# Patient Record
Sex: FEMALE | Race: BLACK OR AFRICAN AMERICAN | NOT HISPANIC OR LATINO | Employment: FULL TIME | URBAN - METROPOLITAN AREA
[De-identification: names, ages, dates, MRNs, and addresses within clinical notes are randomized per-mention and may not be internally consistent; named-entity substitution may affect disease eponyms.]

---

## 2017-02-26 ENCOUNTER — HOSPITAL ENCOUNTER (EMERGENCY)
Facility: HOSPITAL | Age: 27
Discharge: HOME | End: 2017-02-27
Attending: SURGERY
Payer: MEDICAID

## 2017-02-26 DIAGNOSIS — R07.9 CHEST PAIN: ICD-10-CM

## 2017-02-26 DIAGNOSIS — N39.0 URINARY TRACT INFECTION WITHOUT HEMATURIA, SITE UNSPECIFIED: ICD-10-CM

## 2017-02-26 DIAGNOSIS — R00.0 TACHYCARDIA: Primary | ICD-10-CM

## 2017-02-26 PROCEDURE — 36415 COLL VENOUS BLD VENIPUNCTURE: CPT

## 2017-02-26 PROCEDURE — 84100 ASSAY OF PHOSPHORUS: CPT

## 2017-02-26 PROCEDURE — 96374 THER/PROPH/DIAG INJ IV PUSH: CPT

## 2017-02-26 PROCEDURE — 85007 BL SMEAR W/DIFF WBC COUNT: CPT

## 2017-02-26 PROCEDURE — 84443 ASSAY THYROID STIM HORMONE: CPT

## 2017-02-26 PROCEDURE — 83735 ASSAY OF MAGNESIUM: CPT

## 2017-02-26 PROCEDURE — 25000003 PHARM REV CODE 250: Performed by: SURGERY

## 2017-02-26 PROCEDURE — 85379 FIBRIN DEGRADATION QUANT: CPT

## 2017-02-26 PROCEDURE — 85027 COMPLETE CBC AUTOMATED: CPT

## 2017-02-26 PROCEDURE — 93010 ELECTROCARDIOGRAM REPORT: CPT | Mod: ,,, | Performed by: INTERNAL MEDICINE

## 2017-02-26 PROCEDURE — 96375 TX/PRO/DX INJ NEW DRUG ADDON: CPT

## 2017-02-26 PROCEDURE — 83880 ASSAY OF NATRIURETIC PEPTIDE: CPT

## 2017-02-26 PROCEDURE — 84484 ASSAY OF TROPONIN QUANT: CPT

## 2017-02-26 PROCEDURE — 99284 EMERGENCY DEPT VISIT MOD MDM: CPT | Mod: 25

## 2017-02-26 PROCEDURE — 96361 HYDRATE IV INFUSION ADD-ON: CPT

## 2017-02-26 PROCEDURE — 80053 COMPREHEN METABOLIC PANEL: CPT

## 2017-02-26 RX ORDER — ADENOSINE 3 MG/ML
6 INJECTION, SOLUTION INTRAVENOUS
Status: COMPLETED | OUTPATIENT
Start: 2017-02-27 | End: 2017-02-26

## 2017-02-26 RX ORDER — ADENOSINE 3 MG/ML
12 INJECTION, SOLUTION INTRAVENOUS
Status: COMPLETED | OUTPATIENT
Start: 2017-02-27 | End: 2017-02-26

## 2017-02-26 RX ORDER — DILTIAZEM HYDROCHLORIDE 5 MG/ML
INJECTION INTRAVENOUS
Status: DISCONTINUED
Start: 2017-02-26 | End: 2017-02-27 | Stop reason: HOSPADM

## 2017-02-26 RX ORDER — SODIUM CHLORIDE 9 MG/ML
1000 INJECTION, SOLUTION INTRAVENOUS
Status: COMPLETED | OUTPATIENT
Start: 2017-02-26 | End: 2017-02-27

## 2017-02-26 RX ORDER — ADENOSINE 3 MG/ML
INJECTION, SOLUTION INTRAVENOUS
Status: DISCONTINUED
Start: 2017-02-26 | End: 2017-02-27 | Stop reason: HOSPADM

## 2017-02-26 RX ADMIN — ADENOSINE 6 MG: 3 INJECTION, SOLUTION INTRAVENOUS at 11:02

## 2017-02-26 RX ADMIN — ADENOSINE 12 MG: 3 INJECTION, SOLUTION INTRAVENOUS at 11:02

## 2017-02-26 RX ADMIN — MORPHINE SULFATE 2 MG: 2 INJECTION, SOLUTION INTRAMUSCULAR; INTRAVENOUS at 11:02

## 2017-02-26 RX ADMIN — SODIUM CHLORIDE 1000 ML: 0.9 INJECTION, SOLUTION INTRAVENOUS at 11:02

## 2017-02-26 NOTE — ED AVS SNAPSHOT
[facility]  [address] [id number]               Franky Amezcua   2017 11:36 PM   ED    Description:  Female : 1990   Department:  [facility]           Your Care was Coordinated By:     Provider Role From To    Rosanna Faust MD Attending Provider [phone]    Robbie Perera MD Attending Provider 17 [phone] --      Reason for Visit     Anxiety           Diagnoses this Visit        Comments    Tachycardia    -  Primary     Chest pain         Urinary tract infection without hematuria, site unspecified           ED Disposition     ED Disposition Condition Comment    Discharge             To Do List            These Medications        Disp Refills Start End    nitrofurantoin, macrocrystal-monohydrate, (MACROBID) 100 MG capsule 14 capsule 0 2017 3/6/2017    Take 1 capsule (100 mg total) by mouth 2 (two) times daily. - Oral      [facility]     [facility] -  Assistance  Registered nurses in the [facility] provide clinical advisement, health education, appointment booking, and other advisory services.  Call for this free service at [phone].             Medications           Message regarding Medications     Verify the changes and/or additions to your medication regime listed below are the same as discussed with your clinician today.  If any of these changes or additions are incorrect, please notify your healthcare provider.        START taking these NEW medications        Refills    nitrofurantoin, macrocrystal-monohydrate, (MACROBID) 100 MG capsule 0    Sig: Take 1 capsule (100 mg total) by mouth 2 (two) times daily.    Class: Print    Route: Oral      These medications were administered today        Dose Freq    sodium chloride 0.9% bolus 1,000 mL 1,000 mL ED 1 Time    Sig: Inject 1,000 mLs into the vein ED 1 Time.    Class: Normal    Route: Intravenous     0.9%  NaCl infusion 1,000 mL ED 1 Time    Sig: Inject 1,000 mLs into the vein ED 1 Time.    Class: Normal    Route: Intravenous    morphine injection 2 mg 2 mg ED 1 Time    Starting on: 2/27/2017    Sig: Inject 1 mL (2 mg total) into the vein ED 1 Time.    Class: Normal    Route: Intravenous    adenosine injection 6 mg 6 mg ED 1 Time    Starting on: 2/27/2017    Sig: Inject 2 mLs (6 mg total) into the vein ED 1 Time.    Class: Normal    Route: Intravenous    adenosine injection 12 mg 12 mg ED 1 Time    Starting on: 2/27/2017    Sig: Inject 4 mLs (12 mg total) into the vein ED 1 Time.    Class: Normal    Route: Intravenous    adenosine (ADENOCARD) 3 mg/mL injection      Notes to Pharmacy: Created by cabinet override    diltiaZEM (CARDIZEM) 5 mg/mL injection      Notes to Pharmacy: Created by cabinet override    diltiaZEM injection 25 mg 25 mg ED 1 Time    Sig: Inject 5 mLs (25 mg total) into the vein ED 1 Time.    Class: Normal    Route: Intravenous    diltiaZEM injection 25 mg 25 mg ED 1 Time    Sig: Inject 5 mLs (25 mg total) into the vein ED 1 Time.    Class: Normal    Route: Intravenous    potassium chloride SA CR tablet 40 mEq 40 mEq ED 1 Time    Sig: Take 2 tablets (40 mEq total) by mouth ED 1 Time.    Class: Normal    Route: Oral    potassium chloride 10% solution 40 mEq 40 mEq ED 1 Time    Sig: Take 30 mLs (40 mEq total) by mouth ED 1 Time.    Class: Normal    Route: Oral    diltiaZEM 24 hr capsule 120 mg 120 mg ED 1 Time    Sig: Take 1 capsule (120 mg total) by mouth ED 1 Time.    Class: Normal    Route: Oral    metoprolol injection 5 mg 5 mg ED 1 Time    Sig: Inject 5 mLs (5 mg total) into the vein ED 1 Time.    Class: Normal    Route: Intravenous    0.9%  NaCl infusion 1,000 mL ED 1 Time    Sig: Inject 1,000 mLs into the vein ED 1 Time.    Class: Normal    Route: Intravenous    nitrofurantoin (macrocrystal-monohydrate) 100 MG capsule 100 mg 100 mg ED 1 Time    Sig: Take 1 capsule (100 mg total) by mouth ED  1 Time.    Class: Normal    Route: Oral           Verify that the below list of medications is an accurate representation of the medications you are currently taking.  If none reported, the list may be blank. If incorrect, please contact your healthcare provider. Carry this list with you in case of emergency.           Current Medications     nitrofurantoin, macrocrystal-monohydrate, (MACROBID) 100 MG capsule Take 1 capsule (100 mg total) by mouth 2 (two) times daily.           Clinical Reference Information           Your Vitals Were     BP                   110/65           Allergies as of 2/27/2017     No Known Allergies      Immunizations Administered on Date of Encounter - 2/27/2017     None      ED Micro, Lab, POCT     Start Ordered       Status Ordering Provider    02/27/17 0441 02/27/17 0440  Urine culture  Add-on      Completed     02/27/17 0131 02/27/17 0130  TSH  STAT      Final result     02/27/17 0131 02/27/17 0130  Phosphorus  STAT      Final result     02/27/17 0131 02/27/17 0130  D dimer, quantitative  STAT      Final result     02/26/17 2354 02/26/17 2353  Magnesium  STAT      Final result     02/26/17 2351 02/26/17 2351  Troponin I  STAT      Final result     02/26/17 2351 02/26/17 2351  Brain natriuretic peptide  STAT      Final result     02/26/17 2343 02/26/17 2342  Drug screen panel, emergency  STAT      Final result     02/26/17 2343 02/26/17 2342  Pregnancy, urine rapid  STAT      Final result     02/26/17 2342 02/26/17 2342  Complete Blood Count (CBC)  STAT      Final result     02/26/17 2342 02/26/17 2342  Comprehensive Metabolic Panel (CMP)  STAT      Final result     02/26/17 2342 02/26/17 2342  Urinalysis  STAT      Final result     02/26/17 2342 02/26/17 2342  Urinalysis Microscopic  Once      Final result       ED Imaging Orders     Start Ordered       Status Ordering Provider    02/26/17 2351 02/26/17 2351  X-Ray Chest 1 View  1 time imaging      In process         Discharge  "Instructions         Bladder Infection, Female (Adult)    Urine is normally doesn't have any bacteria in it. But bacteria can get into the urinary tract from the skin around the rectum. Or they can travel in the blood from elsewhere in the body. Once they are in your urinary tract, they can cause infection in the urethra (urethritis), the bladder (cystitis), or the kidneys (pyelonephritis).  The most common place for an infection is in the bladder. This is called a bladder infection. This is one of the most common infections in women. Most bladder infections are easily treated. They are not serious unless the infection spreads to the kidney.  The phrases "bladder infection," "UTI," and "cystitis" are often used to describe the same thing. But they are not always the same. Cystitis is an inflammation of the bladder. The most common cause of cystitis is an infection.  Symptoms  The infection causes inflammation in the urethra and bladder. This causes many of the symptoms. The most common symptoms of a bladder infection are:  · Pain or burning when urinating  · Having to urinate more often than usual  · Urgent need to urinate  · Only a small amount of urine comes out  · Blood in urine  · Abdominal discomfort. This is usually in the lower abdomen above the pubic bone.  · Cloudy urine  · Strong- or bad-smelling urine  · Unable to urinate (urinary retention)  · Unable to hold urine in (urinary incontinence)  · Fever  · Loss of appetite  · Confusion (in older adults)  Causes  Bladder infections are not contagious. You can't get one from someone else, from a toilet seat, or from sharing a bath.  The most common cause of bladder infections is bacteria from the bowels. The bacteria get onto the skin around the opening of the urethra. From there, they can get into the urine and travel up to the bladder, causing inflammation and infection. This usually happens because of:  · Wiping improperly after urinating. Always wipe from " front to back.  · Bowel incontinence  · Pregnancy  · Procedures such as having a catheter inserted  · Older age  · Not emptying your bladder. This can allow bacteria a chance to grow in your urine.  · Dehydration  · Constipation  · Sex  · Use of a diaphragm for birth control   Treatment  Bladder infections are diagnosed by a urine test. They are treated with antibiotics and usually clear up quickly without complications. Treatment helps prevent a more serious kidney infection.  Medicines  Medicines can help in the treatment of a bladder infection:  · Take antibiotics until they are used up, even if you feel better. It is important to finish them to make sure the infection has cleared.  · You can use acetaminophen or ibuprofen for pain, fever, or discomfort, unless another medicine was prescribed. If you have chronic liver or kidney disease, talk with your healthcare provider before using these medicines. Also talk with your provider if you've ever had a stomach ulcer or gastrointestinal bleeding, or are taking blood-thinner medicines.  · If you are given phenazopydridine to reduce burning with urination, it will cause your urine to become a bright orange color. This can stain clothing.  Care and prevention  These self-care steps can help prevent future infections:  · Drink plenty of fluids to prevent dehydration and flush out your bladder. Do this unless you must restrict fluids for other health reasons, or your doctor told you not to.  · Proper cleaning after going to the bathroom is important. Wipe from front to back after using the toilet to prevent the spread of bacteria.  · Urinate more often. Don't try to hold urine in for a long time.  · Wear loose-fitting clothes and cotton underwear. Avoid tight-fitting pants.  · Improve your diet and prevent constipation. Eat more fresh fruit and vegetables, and fiber, and less junk and fatty foods.  · Avoid sex until your symptoms are gone.  · Avoid caffeine, alcohol, and  spicy foods. These can irritate your bladder.  · Urinate right after intercourse to flush out your bladder.  · If you use birth control pills and have frequent bladder infections, discuss it with your doctor.  Follow-up care  Call your healthcare provider if all symptoms are not gone after 3 days of treatment. This is especially important if you have repeat infections.  If a culture was done, you will be told if your treatment needs to be changed. If directed, you can call to find out the results.  If X-rays were done, you will be told if the results will affect your treatment.  Call 911  Call 911 if any of the following occur:  · Trouble breathing  · Hard to wake up or confusion  · Fainting or loss of consciousness  · Rapid heart rate  When to seek medical advice  Call your healthcare provider right away if any of these occur:  · Fever of 100.4ºF (38.0ºC) or higher, or as directed by your healthcare provider  · Symptoms are not better by the third day of treatment  · Back or belly (abdominal) pain that gets worse  · Repeated vomiting, or unable to keep medicine down  · Weakness or dizziness  · Vaginal discharge  · Pain, redness, or swelling in the outer vaginal area (labia)  Date Last Reviewed: 10/1/2016  © 1632-4691 [employer]. [address] [place] [address], [place] [address]. All rights reserved. This information is not intended as a substitute for professional medical care. Always follow your healthcare professional's instructions.          Discharge References/Attachments     TACHYCARDIA, UNDERSTANDING (ENGLISH)      [facility] Sign-Up     Activating your [facility] account is as easy as 1-2-3!     1) Visit [email or web address], select Sign Up Now, enter this activation code and your date of birth, then select Next.  [email or web address]  Expires: 4/13/2017 12:27 AM      2) Create a username and password to use when you visit [facility] in the future and select a security question in case you lose your password and  select Next.    3) Enter your e-mail address and click Sign Up!    Additional Information  If you have questions, please e-mail [email or web address] or call [phone] to talk to our [facility] staff. Remember, [facility] is NOT to be used for urgent needs. For medical emergencies, dial 911.          [facility] complies with applicable Federal civil rights laws and does not discriminate on the basis of race, color, national origin, age, disability, or sex.        Language Assistance Services     ATTENTION: Language assistance services are available, free of charge. Please call [phone].      ATENCIÓN: Si habla español, tiene a hinojosa disposición servicios gratuitos de asistencia lingüística. Llame al [phone].     CHÚ Ý: N?u b?n nói Ti?ng Vi?t, có các d?ch v? h? tr? ngôn ng? mi?n phí dành cho b?n. G?i s? [phone].

## 2017-02-27 VITALS
HEART RATE: 82 BPM | DIASTOLIC BLOOD PRESSURE: 65 MMHG | OXYGEN SATURATION: 100 % | TEMPERATURE: 98 F | RESPIRATION RATE: 17 BRPM | WEIGHT: 115 LBS | SYSTOLIC BLOOD PRESSURE: 110 MMHG | BODY MASS INDEX: 20.37 KG/M2

## 2017-02-27 LAB
ALBUMIN SERPL BCP-MCNC: 4.2 G/DL
ALP SERPL-CCNC: 59 U/L
ALT SERPL W/O P-5'-P-CCNC: 11 U/L
AMPHET+METHAMPHET UR QL: NEGATIVE
ANION GAP SERPL CALC-SCNC: 15 MMOL/L
AST SERPL-CCNC: 18 U/L
B-HCG UR QL: NEGATIVE
BACTERIA #/AREA URNS HPF: ABNORMAL /HPF
BARBITURATES UR QL SCN>200 NG/ML: NEGATIVE
BASOPHILS # BLD AUTO: ABNORMAL K/UL
BASOPHILS NFR BLD: 0 %
BENZODIAZ UR QL SCN>200 NG/ML: NEGATIVE
BILIRUB SERPL-MCNC: 0.4 MG/DL
BILIRUB UR QL STRIP: NEGATIVE
BNP SERPL-MCNC: <10 PG/ML
BUN SERPL-MCNC: 16 MG/DL
BZE UR QL SCN: NEGATIVE
CALCIUM SERPL-MCNC: 9.7 MG/DL
CANNABINOIDS UR QL SCN: NORMAL
CHLORIDE SERPL-SCNC: 105 MMOL/L
CLARITY UR: ABNORMAL
CO2 SERPL-SCNC: 18 MMOL/L
COLOR UR: YELLOW
CREAT SERPL-MCNC: 0.9 MG/DL
CREAT UR-MCNC: 237.7 MG/DL
D DIMER PPP IA.FEU-MCNC: 0.48 MG/L FEU
DIFFERENTIAL METHOD: ABNORMAL
EOSINOPHIL # BLD AUTO: ABNORMAL K/UL
EOSINOPHIL NFR BLD: 0 %
ERYTHROCYTE [DISTWIDTH] IN BLOOD BY AUTOMATED COUNT: 14.3 %
EST. GFR  (AFRICAN AMERICAN): >60 ML/MIN/1.73 M^2
EST. GFR  (NON AFRICAN AMERICAN): >60 ML/MIN/1.73 M^2
GIANT PLATELETS BLD QL SMEAR: PRESENT
GLUCOSE SERPL-MCNC: 158 MG/DL
GLUCOSE UR QL STRIP: NEGATIVE
HCT VFR BLD AUTO: 36.4 %
HGB BLD-MCNC: 12 G/DL
HGB UR QL STRIP: ABNORMAL
HYALINE CASTS #/AREA URNS LPF: 0 /LPF
KETONES UR QL STRIP: NEGATIVE
LEUKOCYTE ESTERASE UR QL STRIP: ABNORMAL
LYMPHOCYTES # BLD AUTO: ABNORMAL K/UL
LYMPHOCYTES NFR BLD: 64 %
MAGNESIUM SERPL-MCNC: 2 MG/DL
MCH RBC QN AUTO: 25.8 PG
MCHC RBC AUTO-ENTMCNC: 33 %
MCV RBC AUTO: 78 FL
METHADONE UR QL SCN>300 NG/ML: NEGATIVE
MICROSCOPIC COMMENT: ABNORMAL
MONOCYTES # BLD AUTO: ABNORMAL K/UL
MONOCYTES NFR BLD: 5 %
NEUTROPHILS NFR BLD: 31 %
NITRITE UR QL STRIP: NEGATIVE
OPIATES UR QL SCN: NEGATIVE
PCP UR QL SCN>25 NG/ML: NEGATIVE
PH UR STRIP: 7 [PH] (ref 5–8)
PHOSPHATE SERPL-MCNC: 3.4 MG/DL
PLATELET # BLD AUTO: 307 K/UL
PLATELET BLD QL SMEAR: ABNORMAL
PMV BLD AUTO: 10 FL
POTASSIUM SERPL-SCNC: 3.1 MMOL/L
PROT SERPL-MCNC: 8.8 G/DL
PROT UR QL STRIP: ABNORMAL
RBC # BLD AUTO: 4.66 M/UL
RBC #/AREA URNS HPF: 4 /HPF (ref 0–4)
SCHISTOCYTES BLD QL SMEAR: ABNORMAL
SODIUM SERPL-SCNC: 138 MMOL/L
SP GR UR STRIP: 1.02 (ref 1–1.03)
SQUAMOUS #/AREA URNS HPF: 30 /HPF
TOXICOLOGY INFORMATION: NORMAL
TROPONIN I SERPL DL<=0.01 NG/ML-MCNC: <0.006 NG/ML
TSH SERPL DL<=0.005 MIU/L-ACNC: 1.08 UIU/ML
URN SPEC COLLECT METH UR: ABNORMAL
UROBILINOGEN UR STRIP-ACNC: 1 EU/DL
WBC # BLD AUTO: 11.19 K/UL
WBC #/AREA URNS HPF: 10 /HPF (ref 0–5)
YEAST URNS QL MICRO: ABNORMAL

## 2017-02-27 PROCEDURE — 93041 RHYTHM ECG TRACING: CPT

## 2017-02-27 PROCEDURE — 87186 SC STD MICRODIL/AGAR DIL: CPT

## 2017-02-27 PROCEDURE — 63600175 PHARM REV CODE 636 W HCPCS: Performed by: SURGERY

## 2017-02-27 PROCEDURE — 81000 URINALYSIS NONAUTO W/SCOPE: CPT

## 2017-02-27 PROCEDURE — 36415 COLL VENOUS BLD VENIPUNCTURE: CPT

## 2017-02-27 PROCEDURE — 87088 URINE BACTERIA CULTURE: CPT

## 2017-02-27 PROCEDURE — 81025 URINE PREGNANCY TEST: CPT

## 2017-02-27 PROCEDURE — 87077 CULTURE AEROBIC IDENTIFY: CPT

## 2017-02-27 PROCEDURE — 93005 ELECTROCARDIOGRAM TRACING: CPT

## 2017-02-27 PROCEDURE — 87086 URINE CULTURE/COLONY COUNT: CPT

## 2017-02-27 PROCEDURE — 93010 ELECTROCARDIOGRAM REPORT: CPT | Mod: ,,, | Performed by: INTERNAL MEDICINE

## 2017-02-27 PROCEDURE — 82570 ASSAY OF URINE CREATININE: CPT

## 2017-02-27 PROCEDURE — 25000003 PHARM REV CODE 250: Performed by: SURGERY

## 2017-02-27 RX ORDER — NITROFURANTOIN 25; 75 MG/1; MG/1
100 CAPSULE ORAL 2 TIMES DAILY
Qty: 14 CAPSULE | Refills: 0 | Status: SHIPPED | OUTPATIENT
Start: 2017-02-27 | End: 2017-03-06

## 2017-02-27 RX ORDER — DILTIAZEM HYDROCHLORIDE 120 MG/1
120 CAPSULE, COATED, EXTENDED RELEASE ORAL
Status: COMPLETED | OUTPATIENT
Start: 2017-02-27 | End: 2017-02-27

## 2017-02-27 RX ORDER — METOPROLOL TARTRATE 1 MG/ML
5 INJECTION, SOLUTION INTRAVENOUS
Status: COMPLETED | OUTPATIENT
Start: 2017-02-27 | End: 2017-02-27

## 2017-02-27 RX ORDER — POTASSIUM CHLORIDE 20 MEQ/1
40 TABLET, EXTENDED RELEASE ORAL
Status: COMPLETED | OUTPATIENT
Start: 2017-02-27 | End: 2017-02-27

## 2017-02-27 RX ORDER — POTASSIUM CHLORIDE 20 MEQ/15ML
40 SOLUTION ORAL
Status: COMPLETED | OUTPATIENT
Start: 2017-02-27 | End: 2017-02-27

## 2017-02-27 RX ORDER — NITROFURANTOIN 25; 75 MG/1; MG/1
100 CAPSULE ORAL
Status: COMPLETED | OUTPATIENT
Start: 2017-02-27 | End: 2017-02-27

## 2017-02-27 RX ORDER — DILTIAZEM HYDROCHLORIDE 5 MG/ML
25 INJECTION INTRAVENOUS
Status: COMPLETED | OUTPATIENT
Start: 2017-02-27 | End: 2017-02-27

## 2017-02-27 RX ORDER — SODIUM CHLORIDE 9 MG/ML
1000 INJECTION, SOLUTION INTRAVENOUS
Status: COMPLETED | OUTPATIENT
Start: 2017-02-27 | End: 2017-02-27

## 2017-02-27 RX ADMIN — NITROFURANTOIN MONOHYDRATE AND NITROFURANTOIN MACROCRYSTALLINE 100 MG: 75; 25 CAPSULE ORAL at 04:02

## 2017-02-27 RX ADMIN — DILTIAZEM HYDROCHLORIDE 25 MG: 5 INJECTION INTRAVENOUS at 12:02

## 2017-02-27 RX ADMIN — METOPROLOL TARTRATE 5 MG: 5 INJECTION INTRAVENOUS at 01:02

## 2017-02-27 RX ADMIN — SODIUM CHLORIDE 1000 ML: 0.9 INJECTION, SOLUTION INTRAVENOUS at 01:02

## 2017-02-27 RX ADMIN — SODIUM CHLORIDE 1000 ML: 0.9 INJECTION, SOLUTION INTRAVENOUS at 12:02

## 2017-02-27 RX ADMIN — POTASSIUM CHLORIDE 40 MEQ: 1500 TABLET, EXTENDED RELEASE ORAL at 12:02

## 2017-02-27 RX ADMIN — POTASSIUM CHLORIDE 40 MEQ: 1.5 SOLUTION ORAL at 12:02

## 2017-02-27 RX ADMIN — NIFEDIPINE 120 MG: 10 CAPSULE ORAL at 01:02

## 2017-02-27 NOTE — ED PROVIDER NOTES
[facility]                                                         Chief Complaint  26 y.o. female with Anxiety    History of Present Illness  Franky Amezcua presents to the emergency room with an acute onset of rapid heart rate.  She states she   was getting ready to go to bed when she felt her heart pounding.  She went to speak to her mother who  placed her hand on her chest and realized that her heart was beating very quickly.  She was brought to the   emergency room.  She feels chilled.  She is shaking.  She denies using any drugs or drinking any energy   Drinks. She drank a daiquiri from the [employer].  She was not at a bar.  She never had this before.  No history of cardiac disease.  No nausea or vomiting.  No chest pain but she is aware of her heart beating   quickly.  No radiation of pain.  No weakness.  No diaphoresis.    History reviewed. No pertinent past medical history.  History reviewed. No pertinent surgical history.   Review of patient's allergies indicates:  No Known Allergies     Review of Systems and Physical Exam     Review of Systems  -- Constitution - no fever, denies fatigue, no weakness, no chills  -- Eyes - no tearing or redness, no visual disturbance  -- Ear, Nose - no tinnitus or earache, no nasal congestion or discharge  -- Mouth,Throat - no sore throat, no toothache, normal voice, normal swallowing  -- Respiratory - denies cough and congestion, no shortness of breath, no VALERIO  -- Cardiovascular - denies chest pain, palpitations- acute, denies claudication  -- Gastrointestinal - denies abdominal pain, nausea, vomiting, or diarrhea  -- Genitourinary - no dysuria, no denies flank pain, no hematuria or frequency   -- Musculoskeletal - denies back pain, negative for myalgias and arthralgias   -- Neurological - no headache, denies weakness or seizure; no LOC  -- Skin - denies pallor, rash, or changes in skin. no hives or welts noted    Vital Signs  -- Her blood  pressure is 110/65 and her pulse is 82.   -- Her respiration is 17 and oxygen saturation is 100%.      Physical Exam  -- Nursing note and vitals reviewed  -- Constitutional: Appears well-developed and well-nourished  -- Head: Atraumatic. Normocephalic. No obvious abnormality  -- Eyes: Pupils are equal and reactive to light. Normal conjunctiva and lids  -- Cardiac: Tachycardia, regular rhythm and normal heart sounds  -- Pulmonary: Normal respiratory effort, breath sounds clear to auscultation  -- Abdominal: Soft, no tenderness. Normal bowel sounds. Normal liver edge  -- Musculoskeletal: Normal range of motion, no effusions. Joints stable   -- Neurological: No focal deficits. Showed good interaction with staff  -- Vascular: Posterior tibial, dorsalis pedis and radial pulses 2+ bilaterally      Emergency Room Course     Lab values  -- The CBC drawn in the ER today was within normal limits   -- Urinalysis performed during this ER visit showed signs of infection   -- The urine today has been sent for lab culture, results pending   -- The urine pregnancy test today was negative; patient informed of the results   -- The magnesium and phosphorus were within normal limits   -- The BNP drawn in the ER today were within normal limits   -- TSH was normal, urinalysis had marijuana  -- The d-dimer drawn in the ER today were within normal limits.   -- The cardiac enzymes were within normal limits     Medications Given  -- sodium chloride 0.9% bolus 1,000 mL (0 mLs Intravenous Stopped 2/27/17 0033)   -- 0.9%  NaCl infusion (0 mLs Intravenous Stopped 2/27/17 0135)   -- morphine injection 2 mg (2 mg Intravenous Given 2/26/17 2352)   -- adenosine injection 6 mg (6 mg Intravenous Given 2/26/17 2352)   -- adenosine injection 12 mg (12 mg Intravenous Given 2/26/17 2355)   -- diltiaZEM injection 25 mg (25 mg Intravenous Given 2/27/17 0000)   -- diltiaZEM injection 25 mg (25 mg Intravenous Given 2/27/17 0025)   -- potassium chloride SA CR  tablet 40 mEq (40 mEq Oral Given 2/27/17 0032)   -- potassium chloride 10% solution 40 mEq (40 mEq Oral Given 2/27/17 0052)   -- diltiaZEM 24 hr capsule 120 mg (120 mg Oral Given 2/27/17 0103)   -- metoprolol injection 5 mg (5 mg Intravenous Given 2/27/17 0133)   -- 0.9%  NaCl infusion (0 mLs Intravenous Stopped 2/27/17 0225)   -- nitrofurantoin (macrocrystal-monohydrate) 100 MG capsule 100 mg     ED Management  -- Patient presented to Dr. Faust with a heart rate in the 150s after drinking and using marijuana  -- Patient was given IV fluids and medication in the ER, palpitations stopped and she felt better  -- Patient was monitored over several hours on IV fluid, heart rate now is 82 bpm in the ER  -- EKG is normal sinus rhythm now, negative troponin, negative d-dimer, negative BNP here  -- Asymptomatic now and warm to the issues of drinking and using drugs, voiced understanding    Diagnosis  -- Tachycardia  -- Urinary tract infection without hematuria    Disposition and Plan  -- Disposition: home  -- Condition: stable  -- Follow-up: Patient to follow up with a MD in 1-2 days.  -- I advised the patient that we have found no life threatening condition today  -- At this time, I believe the patient is clinically stable for discharge.   -- The patient acknowledges that close follow up with a MD is required   -- Patient agrees to comply with all instruction and direction given in the ER    This note is dictated on Dragon Natural Speaking word recognition program.  There are word recognition mistakes that are occasionally missed on review.             Robbie Perera MD  02/27/17 8342

## 2017-02-27 NOTE — DISCHARGE INSTRUCTIONS

## 2017-03-01 LAB — BACTERIA UR CULT: NORMAL

## 2017-03-02 NOTE — PHYSICIAN QUERY
PT Name: Franky Amezcua  MR #: [id number]     Physician Query Form - Documentation Clarification    Reviewer  Pat Olvera   Ext [phone]    This form is a permanent document in the medical record.     Query Date: March 2, 2017  By submitting this query, we are merely seeking further clarification of documentation to reflect the severity of illness of your patient. Please utilize your independent clinical judgment when addressing the question(s) below.    (The Medical record reflects the following:)      Supporting Clinical Findings Location in Medical Record   Urine culture positive for Staphylococcus Epidermidis >10,000 - 49,999.       Please review lab culture results.                                                                                      Doctor, Please specify diagnosis or diagnoses associated with above clinical findings.    Physician Use Only                                                                                                                               [  ] Unable to determine          Patient discharged on Macroabid.  Urine culture not consistent with infection.  Patient to follow up with PCP.

## 2017-05-01 ENCOUNTER — HOSPITAL ENCOUNTER (EMERGENCY)
Facility: HOSPITAL | Age: 27
Discharge: HOME | End: 2017-05-01
Attending: SURGERY
Payer: MEDICAID

## 2017-05-01 VITALS
SYSTOLIC BLOOD PRESSURE: 112 MMHG | RESPIRATION RATE: 20 BRPM | HEART RATE: 95 BPM | TEMPERATURE: 97 F | DIASTOLIC BLOOD PRESSURE: 60 MMHG

## 2017-05-01 DIAGNOSIS — N30.00 ACUTE CYSTITIS WITHOUT HEMATURIA: Primary | ICD-10-CM

## 2017-05-01 LAB
ALBUMIN SERPL BCP-MCNC: 3.2 G/DL
ALP SERPL-CCNC: 46 U/L
ALT SERPL W/O P-5'-P-CCNC: 12 U/L
AMYLASE SERPL-CCNC: 91 U/L
ANION GAP SERPL CALC-SCNC: 10 MMOL/L
AST SERPL-CCNC: 17 U/L
BACTERIA #/AREA URNS HPF: ABNORMAL /HPF
BASOPHILS # BLD AUTO: 0.01 K/UL
BASOPHILS NFR BLD: 0.2 %
BILIRUB SERPL-MCNC: 0.1 MG/DL
BILIRUB UR QL STRIP: NEGATIVE
BUN SERPL-MCNC: 11 MG/DL
CALCIUM SERPL-MCNC: 9 MG/DL
CHLORIDE SERPL-SCNC: 104 MMOL/L
CLARITY UR: CLEAR
CO2 SERPL-SCNC: 22 MMOL/L
COLOR UR: YELLOW
CREAT SERPL-MCNC: 0.6 MG/DL
DIFFERENTIAL METHOD: ABNORMAL
EOSINOPHIL # BLD AUTO: 0.1 K/UL
EOSINOPHIL NFR BLD: 1.8 %
ERYTHROCYTE [DISTWIDTH] IN BLOOD BY AUTOMATED COUNT: 14.9 %
EST. GFR  (AFRICAN AMERICAN): >60 ML/MIN/1.73 M^2
EST. GFR  (NON AFRICAN AMERICAN): >60 ML/MIN/1.73 M^2
GLUCOSE SERPL-MCNC: 73 MG/DL
GLUCOSE UR QL STRIP: NEGATIVE
HCT VFR BLD AUTO: 31.4 %
HGB BLD-MCNC: 10.3 G/DL
HGB UR QL STRIP: ABNORMAL
HYALINE CASTS #/AREA URNS LPF: 0 /LPF
KETONES UR QL STRIP: NEGATIVE
LEUKOCYTE ESTERASE UR QL STRIP: ABNORMAL
LYMPHOCYTES # BLD AUTO: 2 K/UL
LYMPHOCYTES NFR BLD: 30.8 %
MCH RBC QN AUTO: 25.4 PG
MCHC RBC AUTO-ENTMCNC: 32.8 %
MCV RBC AUTO: 77 FL
MICROSCOPIC COMMENT: ABNORMAL
MONOCYTES # BLD AUTO: 0.5 K/UL
MONOCYTES NFR BLD: 7.4 %
NEUTROPHILS # BLD AUTO: 4 K/UL
NEUTROPHILS NFR BLD: 59.8 %
NITRITE UR QL STRIP: NEGATIVE
PH UR STRIP: 7 [PH] (ref 5–8)
PLATELET # BLD AUTO: 243 K/UL
PMV BLD AUTO: 8.5 FL
POTASSIUM SERPL-SCNC: 4 MMOL/L
PROT SERPL-MCNC: 7.7 G/DL
PROT UR QL STRIP: ABNORMAL
RBC # BLD AUTO: 4.06 M/UL
RBC #/AREA URNS HPF: 3 /HPF (ref 0–4)
SODIUM SERPL-SCNC: 136 MMOL/L
SP GR UR STRIP: 1.02 (ref 1–1.03)
URN SPEC COLLECT METH UR: ABNORMAL
UROBILINOGEN UR STRIP-ACNC: ABNORMAL EU/DL
WBC # BLD AUTO: 6.6 K/UL
WBC #/AREA URNS HPF: 8 /HPF (ref 0–5)

## 2017-05-01 PROCEDURE — 99284 EMERGENCY DEPT VISIT MOD MDM: CPT | Mod: 25

## 2017-05-01 PROCEDURE — 80053 COMPREHEN METABOLIC PANEL: CPT

## 2017-05-01 PROCEDURE — 36415 COLL VENOUS BLD VENIPUNCTURE: CPT

## 2017-05-01 PROCEDURE — 63600175 PHARM REV CODE 636 W HCPCS: Performed by: SURGERY

## 2017-05-01 PROCEDURE — 96372 THER/PROPH/DIAG INJ SC/IM: CPT

## 2017-05-01 PROCEDURE — 81000 URINALYSIS NONAUTO W/SCOPE: CPT

## 2017-05-01 PROCEDURE — 82150 ASSAY OF AMYLASE: CPT

## 2017-05-01 PROCEDURE — 87086 URINE CULTURE/COLONY COUNT: CPT

## 2017-05-01 PROCEDURE — 85025 COMPLETE CBC W/AUTO DIFF WBC: CPT

## 2017-05-01 RX ORDER — CEFTRIAXONE 1 G/1
1 INJECTION, POWDER, FOR SOLUTION INTRAMUSCULAR; INTRAVENOUS
Status: COMPLETED | OUTPATIENT
Start: 2017-05-01 | End: 2017-05-01

## 2017-05-01 RX ORDER — NITROFURANTOIN 25; 75 MG/1; MG/1
100 CAPSULE ORAL 2 TIMES DAILY
Qty: 14 CAPSULE | Refills: 0 | Status: SHIPPED | OUTPATIENT
Start: 2017-05-01 | End: 2017-05-08

## 2017-05-01 RX ADMIN — CEFTRIAXONE SODIUM 1 G: 1 INJECTION, POWDER, FOR SOLUTION INTRAMUSCULAR; INTRAVENOUS at 08:05

## 2017-05-01 NOTE — ED AVS SNAPSHOT
[facility]  [address] [facility] [address]               Franky Amezcua   2017  7:14 PM   ED    Description:  Female : 1990   Department:  [facility]           Your Care was Coordinated By:     Provider Role From To    Robbie Perera MD Attending Provider [phone] --      Reason for Visit     Back Pain           Diagnoses this Visit        Comments    Acute cystitis without hematuria    -  Primary       ED Disposition     ED Disposition Condition Comment    Discharge             To Do List            These Medications        Disp Refills Start End    nitrofurantoin, macrocrystal-monohydrate, (MACROBID) 100 MG capsule 14 capsule 0 2017    Take 1 capsule (100 mg total) by mouth 2 (two) times daily. - Oral      [facility]     [facility] -  Assistance  Unless otherwise directed by your provider, please contact [facility], our nurse care line that is available for  assistance.     Registered nurses in the [facility] provide: appointment scheduling, clinical advisement, health education, and other advisory services.  Call: [phone] (toll free)               Medications           Message regarding Medications     Verify the changes and/or additions to your medication regime listed below are the same as discussed with your clinician today.  If any of these changes or additions are incorrect, please notify your healthcare provider.        START taking these NEW medications        Refills    nitrofurantoin, macrocrystal-monohydrate, (MACROBID) 100 MG capsule 0    Sig: Take 1 capsule (100 mg total) by mouth 2 (two) times daily.    Class: Print    Route: Oral      These medications were administered today        Dose Freq    cefTRIAXone injection 1 g 1 g ED 1 Time    Sig: Inject 1 g into the muscle ED 1 Time.    Class: Normal    Route: Intramuscular           Verify that  the below list of medications is an accurate representation of the medications you are currently taking.  If none reported, the list may be blank. If incorrect, please contact your healthcare provider. Carry this list with you in case of emergency.           Current Medications     nitrofurantoin, macrocrystal-monohydrate, (MACROBID) 100 MG capsule Take 1 capsule (100 mg total) by mouth 2 (two) times daily.           Clinical Reference Information           Your Vitals Were     BP Pulse Temp Resp Last Period       112/60 (BP Location: Left arm, Patient Position: Sitting) 95 97.4 °F (36.3 °C) (Oral) 20 02/06/2017       Allergies as of 5/1/2017     No Known Allergies      Immunizations Administered on Date of Encounter - 5/1/2017     None      ED Micro, Lab, POCT     Start Ordered       Status Ordering Provider    05/01/17 2036 05/01/17 2035  Urine culture  Add-on      Completed     05/01/17 1921 05/01/17 1920  CBC auto differential  STAT      Final result     05/01/17 1921 05/01/17 1920  Comprehensive metabolic panel  STAT      Final result     05/01/17 1921 05/01/17 1920  Amylase  Once      Final result     05/01/17 1921 05/01/17 1920  Urinalysis  STAT      Final result     05/01/17 1920 05/01/17 1920  Urinalysis Microscopic  Once      Final result     05/01/17 1920 05/01/17 1920  Urine culture  Once      In process       ED Imaging Orders     Start Ordered       Status Ordering Provider    05/01/17 1925 05/01/17 1924  US OB Less Than 14 Wks First Gestation  1 time imaging      In process         Discharge Instructions         Understanding Urinary Tract Infections (UTIs)  Most UTIs are caused by bacteria, although they may also be caused by viruses or fungi. Bacteria from the bowel are the most common source of infection. The infection may begin because of any of the following:  · Sexual activity. During sex, germs can travel from the penis, vagina, or rectum into the urethra.   · Germs on the skin outside the  rectum may travel into the urethra. This is more common in women since the rectum and urethra are closer to each other than in men. Wiping from front to back after using the toilet and keeping the area clean can help prevent germs from getting to the urethra.  · Blockage of urine flow through the urinary tract. If urine sits too long, germs may begin to grow out of control.      Parts of the urinary tract  The infection can occur in any part of the urinary tract.  · The kidneys collect and store urine.  · The ureters carry urine from the kidneys to the bladder.  · The bladder holds urine until you are ready to let it out.  · The urethra carries urine from the bladder out of the body. It is shorter in women, so bacteria can move through it more easily. The urethra is longer in men, so a UTI is less likely to reach the bladder or kidneys in men.  Date Last Reviewed: 9/8/2014  © 5233-4071 [employer]. [address] [place] [address]. All rights reserved. This information is not intended as a substitute for professional medical care. Always follow your healthcare professional's instructions.          [facility] Sign-Up     Activating your [facility] account is as easy as 1-2-3!     1) Visit [email or web address], select Sign Up Now, enter this activation code and your date of birth, then select Next.  [email or web address]  Expires: 6/15/2017  8:43 PM      2) Create a username and password to use when you visit [facility] in the future and select a security question in case you lose your password and select Next.    3) Enter your e-mail address and click Sign Up!    Additional Information  If you have questions, please e-mail [email or web address] or call [phone] to talk to our [facility] staff. Remember, [facility] is NOT to be used for urgent needs. For medical emergencies, dial 911.          [facility] complies with applicable Federal civil rights laws and does not discriminate on the  basis of race, color, national origin, age, disability, or sex.        Language Assistance Services     ATTENTION: Language assistance services are available, free of charge. Please call [phone].      ATENCIÓN: Si habla lars, tiene a hinojosa disposición servicios gratuitos de asistencia lingüística. Llame al [phone].     CHÚ Ý: N?u b?n nói Ti?ng Vi?t, có các d?ch v? h? tr? ngôn ng? mi?n phí dành cho b?n. G?i s? [phone].

## 2017-05-02 NOTE — DISCHARGE INSTRUCTIONS
Understanding Urinary Tract Infections (UTIs)  Most UTIs are caused by bacteria, although they may also be caused by viruses or fungi. Bacteria from the bowel are the most common source of infection. The infection may begin because of any of the following:  · Sexual activity. During sex, germs can travel from the penis, vagina, or rectum into the urethra.   · Germs on the skin outside the rectum may travel into the urethra. This is more common in women since the rectum and urethra are closer to each other than in men. Wiping from front to back after using the toilet and keeping the area clean can help prevent germs from getting to the urethra.  · Blockage of urine flow through the urinary tract. If urine sits too long, germs may begin to grow out of control.      Parts of the urinary tract  The infection can occur in any part of the urinary tract.  · The kidneys collect and store urine.  · The ureters carry urine from the kidneys to the bladder.  · The bladder holds urine until you are ready to let it out.  · The urethra carries urine from the bladder out of the body. It is shorter in women, so bacteria can move through it more easily. The urethra is longer in men, so a UTI is less likely to reach the bladder or kidneys in men.  Date Last Reviewed: 9/8/2014  © 8858-6785 [employer]. [address] [place] [address], [place] [address]. All rights reserved. This information is not intended as a substitute for professional medical care. Always follow your healthcare professional's instructions.

## 2017-05-02 NOTE — ED PROVIDER NOTES
[facility]                                         Chief Complaint  27 y.o. female with Back Pain (3 months iup)    History of Present Illness  Franky Amezcua presents to the emergency room with back pain tonight  Patient states that she has back pain with dysuria, is 12 weeks pregnant  Patient on exam is a soft gravid abdomen no signs of peritonitis or rebound  Patient has a normal flank exam and normal back exam tonight in the ER  Patient has no hematuria, pyuria, vaginal discharge or risk for STD or PID  Patient denies any contractions or leakage of fluid, denies any spotting today    The history is provided by the patient  History reviewed. No pertinent past medical history.  No past surgical history on file.   No Known Allergies     Review of Systems and Physical Exam     Review of Systems  -- Constitution - no fever, denies fatigue, no weakness, no chills  -- Eyes - no tearing or redness, no visual disturbance  -- Ear, Nose - no tinnitus or earache, no nasal congestion or discharge  -- Mouth,Throat - no sore throat, no toothache, normal voice, normal swallowing  -- Respiratory - denies cough and congestion, no shortness of breath, no VALERIO  -- Cardiovascular - denies chest pain, no palpitations, denies claudication  -- Gastrointestinal - denies abdominal pain, nausea, vomiting, or diarrhea  -- Genitourinary - dysuria, no denies flank pain, no hematuria or frequency   -- Musculoskeletal - back pain, negative for myalgias and arthralgias   -- Neurological - no headache, denies weakness or seizure; no LOC  -- Skin - denies pallor, rash, or changes in skin. no hives or welts noted    Vital Signs  -- Her blood pressure is 112/60 and her pulse is 95. Her respiration is 20.      Physical Exam  -- Nursing note and vitals reviewed  -- Constitutional: Appears well-developed and well-nourished  -- Head: Atraumatic. Normocephalic. No obvious abnormality  -- Eyes: Pupils are equal and reactive to  light. Normal conjunctiva and lids  -- Cardiac: Normal rate, regular rhythm and normal heart sounds  -- Pulmonary: Normal respiratory effort, breath sounds clear to auscultation  -- Abdominal: Soft, no tenderness. Normal bowel sounds. Normal liver edge  -- Genitourinary: no flank pain on exam, no suprapubic pain by palpation   -- Musculoskeletal: Normal range of motion, no effusions. Joints stable   -- Neurological: No focal deficits. Showed good interaction with staff  -- Vascular: Posterior tibial, dorsalis pedis and radial pulses 2+ bilaterally      Emergency Room Course     Treatment and Evaluation  -- The electrolytes drawn in the ER today were within normal limits   -- The CBC drawn in the ER today was within normal limits   -- Amylase drawn in the ER today was within normal limits   -- Urine showed signs of infection and was cultured  -- OB/GYN ultrasound showed a 12 week IUP with no issues  -- IM 1 g Rocephin given today in the ER    Diagnosis  -- The encounter diagnosis was Acute cystitis without hematuria.    Disposition and Plan  -- Disposition: home  -- Condition: stable  -- Follow-up: Patient to follow up with Primary Doctor No in 1-2 days.  -- I advised the patient that we have found no life threatening condition today  -- At this time, I believe the patient is clinically stable for discharge.   -- The patient acknowledges that close follow up with a MD is required   -- Patient agrees to comply with all instruction and direction given in the ER    This note is dictated on Dragon Natural Speaking word recognition program.  There are word recognition mistakes that are occasionally missed on review.           Robbie Perera MD  05/02/17 0953

## 2017-05-03 LAB — BACTERIA UR CULT: NO GROWTH

## 2017-09-24 ENCOUNTER — HOSPITAL ENCOUNTER (OUTPATIENT)
Facility: HOSPITAL | Age: 27
Discharge: HOME | End: 2017-09-24
Attending: SURGERY | Admitting: OBSTETRICS & GYNECOLOGY
Payer: MEDICAID

## 2017-09-24 VITALS
BODY MASS INDEX: 23.04 KG/M2 | RESPIRATION RATE: 18 BRPM | TEMPERATURE: 98 F | SYSTOLIC BLOOD PRESSURE: 113 MMHG | DIASTOLIC BLOOD PRESSURE: 62 MMHG | OXYGEN SATURATION: 100 % | WEIGHT: 130 LBS | HEIGHT: 63 IN | HEART RATE: 100 BPM

## 2017-09-24 DIAGNOSIS — O26.899 CRAMPING AFFECTING PREGNANCY, ANTEPARTUM: ICD-10-CM

## 2017-09-24 DIAGNOSIS — R10.9 CRAMPING AFFECTING PREGNANCY, ANTEPARTUM: ICD-10-CM

## 2017-09-24 DIAGNOSIS — R19.7 DIARRHEA, UNSPECIFIED TYPE: Primary | ICD-10-CM

## 2017-09-24 LAB
ALBUMIN SERPL BCP-MCNC: 2.8 G/DL
ALP SERPL-CCNC: 58 U/L
ALT SERPL W/O P-5'-P-CCNC: 27 U/L
ANION GAP SERPL CALC-SCNC: 7 MMOL/L
AST SERPL-CCNC: 29 U/L
B-HCG UR QL: POSITIVE
BACTERIA #/AREA URNS HPF: ABNORMAL /HPF
BASOPHILS # BLD AUTO: 0 K/UL
BASOPHILS NFR BLD: 0 %
BILIRUB SERPL-MCNC: 0.3 MG/DL
BILIRUB UR QL STRIP: NEGATIVE
BUN SERPL-MCNC: 9 MG/DL
CALCIUM SERPL-MCNC: 9.1 MG/DL
CHLORIDE SERPL-SCNC: 107 MMOL/L
CLARITY UR: CLEAR
CO2 SERPL-SCNC: 23 MMOL/L
COLOR UR: YELLOW
CREAT SERPL-MCNC: 0.6 MG/DL
DIFFERENTIAL METHOD: ABNORMAL
EOSINOPHIL # BLD AUTO: 0.1 K/UL
EOSINOPHIL NFR BLD: 1.4 %
ERYTHROCYTE [DISTWIDTH] IN BLOOD BY AUTOMATED COUNT: 16.2 %
EST. GFR  (AFRICAN AMERICAN): >60 ML/MIN/1.73 M^2
EST. GFR  (NON AFRICAN AMERICAN): >60 ML/MIN/1.73 M^2
GLUCOSE SERPL-MCNC: 79 MG/DL
GLUCOSE UR QL STRIP: NEGATIVE
HCT VFR BLD AUTO: 25.7 %
HGB BLD-MCNC: 8 G/DL
HGB UR QL STRIP: NEGATIVE
HYALINE CASTS #/AREA URNS LPF: 0 /LPF
KETONES UR QL STRIP: NEGATIVE
LEUKOCYTE ESTERASE UR QL STRIP: ABNORMAL
LIPASE SERPL-CCNC: 33 U/L
LYMPHOCYTES # BLD AUTO: 1.6 K/UL
LYMPHOCYTES NFR BLD: 26.2 %
MCH RBC QN AUTO: 23.2 PG
MCHC RBC AUTO-ENTMCNC: 31.1 G/DL
MCV RBC AUTO: 75 FL
MICROSCOPIC COMMENT: ABNORMAL
MONOCYTES # BLD AUTO: 0.4 K/UL
MONOCYTES NFR BLD: 7 %
NEUTROPHILS # BLD AUTO: 4.1 K/UL
NEUTROPHILS NFR BLD: 65.4 %
NITRITE UR QL STRIP: NEGATIVE
PH UR STRIP: 8 [PH] (ref 5–8)
PLATELET # BLD AUTO: 223 K/UL
PMV BLD AUTO: 8.7 FL
POTASSIUM SERPL-SCNC: 3.9 MMOL/L
PROT SERPL-MCNC: 7.1 G/DL
PROT UR QL STRIP: ABNORMAL
RBC # BLD AUTO: 3.45 M/UL
RBC #/AREA URNS HPF: 1 /HPF (ref 0–4)
SODIUM SERPL-SCNC: 137 MMOL/L
SP GR UR STRIP: 1.02 (ref 1–1.03)
SQUAMOUS #/AREA URNS HPF: 7 /HPF
URN SPEC COLLECT METH UR: ABNORMAL
UROBILINOGEN UR STRIP-ACNC: 1 EU/DL
WBC # BLD AUTO: 6.26 K/UL
WBC #/AREA URNS HPF: 65 /HPF (ref 0–5)
YEAST URNS QL MICRO: ABNORMAL

## 2017-09-24 PROCEDURE — 85025 COMPLETE CBC W/AUTO DIFF WBC: CPT

## 2017-09-24 PROCEDURE — G0378 HOSPITAL OBSERVATION PER HR: HCPCS

## 2017-09-24 PROCEDURE — 83690 ASSAY OF LIPASE: CPT

## 2017-09-24 PROCEDURE — 27000339 *HC DAILY SUPPLY KIT

## 2017-09-24 PROCEDURE — 80053 COMPREHEN METABOLIC PANEL: CPT

## 2017-09-24 PROCEDURE — 99220 PR INITIAL OBSERVATION CARE,LEVL III: CPT | Mod: 25,,, | Performed by: OBSTETRICS & GYNECOLOGY

## 2017-09-24 PROCEDURE — 96360 HYDRATION IV INFUSION INIT: CPT

## 2017-09-24 PROCEDURE — 81000 URINALYSIS NONAUTO W/SCOPE: CPT

## 2017-09-24 PROCEDURE — 99201 *HC E&M-NEW PATIENT-LVL I: CPT

## 2017-09-24 PROCEDURE — 25000003 PHARM REV CODE 250: Performed by: SURGERY

## 2017-09-24 PROCEDURE — 81025 URINE PREGNANCY TEST: CPT

## 2017-09-24 PROCEDURE — 59025 FETAL NON-STRESS TEST: CPT

## 2017-09-24 PROCEDURE — 99285 EMERGENCY DEPT VISIT HI MDM: CPT | Mod: 25,27

## 2017-09-24 PROCEDURE — 59025 FETAL NON-STRESS TEST: CPT | Mod: 26,,, | Performed by: OBSTETRICS & GYNECOLOGY

## 2017-09-24 PROCEDURE — 36415 COLL VENOUS BLD VENIPUNCTURE: CPT

## 2017-09-24 RX ORDER — SODIUM CHLORIDE 9 MG/ML
1000 INJECTION, SOLUTION INTRAVENOUS
Status: COMPLETED | OUTPATIENT
Start: 2017-09-24 | End: 2017-09-24

## 2017-09-24 RX ORDER — ONDANSETRON 4 MG/1
8 TABLET, ORALLY DISINTEGRATING ORAL EVERY 8 HOURS PRN
Status: DISCONTINUED | OUTPATIENT
Start: 2017-09-24 | End: 2017-09-24 | Stop reason: HOSPADM

## 2017-09-24 RX ORDER — ACETAMINOPHEN 500 MG
500 TABLET ORAL EVERY 6 HOURS PRN
Status: DISCONTINUED | OUTPATIENT
Start: 2017-09-24 | End: 2017-09-24 | Stop reason: HOSPADM

## 2017-09-24 RX ORDER — SODIUM CHLORIDE 9 MG/ML
1000 INJECTION, SOLUTION INTRAVENOUS CONTINUOUS
Status: DISCONTINUED | OUTPATIENT
Start: 2017-09-24 | End: 2017-09-24

## 2017-09-24 RX ADMIN — SODIUM CHLORIDE 1000 ML: 0.9 INJECTION, SOLUTION INTRAVENOUS at 07:09

## 2017-09-24 NOTE — ED TRIAGE NOTES
Patient c/o nausea and diarrhea last night and this morning. Denies pain but states is still a little nauseated.

## 2017-09-25 NOTE — ED PROVIDER NOTES
[facility]                                     September 24, 2017                   Chief Complaint  27 y.o. female with Diarrhea    History of Present Illness  Franky Amezcua presents to the emergency room with diarrhea since last night  Patient has a months pregnant and states he has diarrhea after eating ITC  Patient on exam has a soft gravid abdomen, no signs of pain, normal bowel sounds  Patient did have some minor lower abdominal cramping earlier but not in the ER   Patient denies any fever weight loss, has no risk for C. difficile on interview today    The history is provided by the patient  History reviewed. No pertinent past medical history.  History reviewed. No pertinent surgical history.   No Known Allergies   History reviewed. No pertinent family history.    Review of Systems and Physical Exam     Review of Systems  -- Constitution - no fever, denies fatigue, no weakness, no chills  -- Eyes - no tearing or redness, no visual disturbance  -- Ear, Nose - no tinnitus or earache, no nasal congestion or discharge  -- Mouth,Throat - no sore throat, no toothache, normal voice, normal swallowing  -- Respiratory - denies cough and congestion, no shortness of breath, no VALERIO  -- Cardiovascular - denies chest pain, no palpitations, denies claudication  -- Gastrointestinal - lower abdominal cramps with watery diarrhea, no vomiting  -- Genitourinary - no dysuria, no denies flank pain, no hematuria or frequency   -- Musculoskeletal - denies back pain, negative for myalgias and arthralgias   -- Neurological - no headache, denies weakness or seizure; no LOC  -- Skin - denies pallor, rash, or changes in skin. no hives or welts noted    Vital Signs  -- Her oral temperature is 98.4 °F (36.9 °C).   -- Her blood pressure is 85/59 and her pulse is 108.   -- Her respiration is 16 and oxygen saturation is 100%.      Physical Exam  -- Nursing note and vitals reviewed  -- Constitutional: Appears  well-developed and well-nourished  -- Head: Atraumatic. Normocephalic. No obvious abnormality  -- Eyes: Pupils are equal and reactive to light. Normal conjunctiva and lids  -- Cardiac: Normal rate, regular rhythm and normal heart sounds  -- Pulmonary: Normal respiratory effort, breath sounds clear to auscultation  -- Abdominal: Soft gravid abdomen  -- Genitourinary: no flank pain on exam, no suprapubic pain by palpation   -- Musculoskeletal: Normal range of motion, no effusions. Joints stable   -- Neurological: No focal deficits. Showed good interaction with staff  -- Vascular: Posterior tibial, dorsalis pedis and radial pulses 2+ bilaterally      Emergency Room Course     Treatment and Evaluation  -- Fetal heart monitoring was within normal limits  -- Saline lock started in the ER per protocol  -- IV 1000 ml NS given in today in the ER    Abnormal lab values  --    -- K 3.9   --    -- CO2 23   -- BUN 9   -- CREATININE 0.6   -- GLU 79   -- ALKPHOS 58   -- AST 29   -- ALT 27   -- BILITOT 0.3   -- ALBUMIN 2.8 (L)   -- PROT 7.1   -- WBC 6.26   -- HGB 8.0 (L)   -- HCT 25.7 (L)   --      ED Physician Notes  -- 7:45 PM: I discussed this patient with Dr. Greene, transferred to L&D  -- Will monitor the patient with IV fluids, fetal heart monitoring in L&D    Diagnosis  -- The encounter diagnosis was Diarrhea, unspecified type.    Disposition and Plan  -- Disposition: transfer to labor and delivery  -- Condition: stable  -- The patient needs a higher level of care  -- The patient is appropriate for transfer    This note is dictated on Dragon Natural Speaking word recognition program.  There are word recognition mistakes that are occasionally missed on review.           Robbie Perera MD  09/24/17 1945

## 2017-09-25 NOTE — DISCHARGE SUMMARY
Discharge Note      SUMMARY     Admit Date: 9/24/2017    Attending Physician: Emilee Greene MD     Discharge Physician: Emilee Greene MD    Discharge Date: 9/24/2017     Admit Diagnosis:  IUP at 32 6/7 weeks; Diarrhea; Cramping in pregnancy    Final Diagnosis:  IUP at 32 6/7 weeks; Diarrhea; Cramping in pregnancy    Procedure: Nonstress test; IV hydration (in ED)    Disposition: Home or Self Care    Condition: Stable    Hospital Course: Pt presented at 32 6/7 weeks with complaints of diarrhea since last pm and cramping.  She was assessed and received IV hydration in ED.  She had reactive NST on L&D.  She was discharged home in good condition and instructed to increase PO hydration.  PTL precautions and fetal kick counts emphasized.  She is to follow up with her primary obstetrician within 3 days.    Patient Instructions:   There are no discharge medications for this patient.      Discharge Procedure Orders (must include Diet, Follow-up, Activity)    Discharge Procedure Orders (must include Diet, Follow-up, Activity)  Diet general        Activity:  As tolerated    Follow-up Information     Pa Yoo Jr, MD In 2 days.    Specialty:  Obstetrics and Gynecology  Contact information:  [address] [facility], [place] [address] [phone]

## 2017-09-25 NOTE — PROCEDURES
FETAL ASSESSMENT REPORT    RE: Franky Amezcua  MRN:  [id number]  :  1990  AGE:  27 y.o.    Date:  2017    REFERRAL PHYSICIAN: Dr. Emilee Greene    Allergies: Review of patient's allergies indicates no known allergies.    Franky is a 27 y.o.  at 32w6d gestational age here today for a NST due to cramping and diarrhea.    Not found.    MEDICATIONS AT TIME OF TEST:  Current Facility-Administered Medications   Medication Dose Route Frequency Provider Last Rate Last Dose    acetaminophen tablet 500 mg  500 mg Oral Q6H PRN Emilee Greene MD        ondansetron disintegrating tablet 8 mg  8 mg Oral Q8H PRN Emilee Greene MD        promethazine (PHENERGAN) 12.5 mg in dextrose 5 % 50 mL IVPB  12.5 mg Intravenous Q6H PRN Emilee Greene MD           Indication: cramping in pregnancy; diarrhea    Interpretation:  140 BPM baseline    Variability:  Good {> 6 bpm)    Accelerations:  Reactive    Decelerations:  none    Assessment: reactive    Plan:  NST reactive; discharge home.  Pt to increase PO hydration and follow-up with her primary obstetrician within 3 days.      Emilee Greene MD  2017; 8:27 PM

## 2017-09-25 NOTE — H&P
History and Physical  Obstetrics      SUBJECTIVE:     Franky Amezcua is a 27 y.o.  female  at 32w6d weeks gestation with an Estimated Date of Delivery: 17 who presents complaining of diarrhea and associated mild abdominal cramping. States the diarrhea began last pm after eating [employer].  She denies contractions, vaginal bleeding, and leakage of fluid.  She reports good FM.  Was assessed in ED, given IV hydration, and cleared for discharge after fetal assessment and monitoring.  She receives prenatal care elsewhere.     She has been followed prenatally with the following prenatal complications:   Active Hospital Problems    Diagnosis    Diarrhea         HISTORY:     Prior to Admission medications    Not on File      History reviewed. No pertinent past medical history.  Past Surgical History:   Procedure Laterality Date     SECTION       History reviewed. No pertinent family history.  Social History   Substance Use Topics    Smoking status: Never Smoker    Smokeless tobacco: Never Used    Alcohol use No     OB History    Para Term  AB Living   2 1           SAB TAB Ectopic Multiple Live Births                  # Outcome Date GA Lbr Isma/2nd Weight Sex Delivery Anes PTL Lv   2 Current            1 Para                   Allergy:   Review of patient's allergies indicates:  No Known Allergies       Review of Systems:  Positive for diarrhea      OBJECTIVE:     Initial Vitals [17 1839]   BP Pulse Resp Temp SpO2   (!) 85/59 108 16 98.4 °F (36.9 °C) 100 %      MAP       67.67             Physical Exam:  General:  alert,normal appearing gravid female   Skin:  Skin color, texture, turgor normal. No rashes or lesions   HEENT:  conjunctivae/corneas clear. ANUPAM   Lungs:  clear to auscultation bilaterally   Heart:  regular rate and rhythm, S1, S2 normal, no murmur, click, rub or gallop   Breasts:   Nipples are protruding and have no nipple discharge. No palpable masses, erythema,  skin changes, tenderness, or adenopathy.   Abdomen:  soft, non-tender; bowel sounds normal   Uterine Size:  consistent with dates   Presentations:  cephalic   FHT: 140 BPM; + reactive  [place]en:  Irritability with a few irregular contractions   Pelvis: External genitalia: normal external genitalia without lesions  Vaginal: without lesions or discharge   Cervix:     Dilation: closed    Effacement: thick    Station:  high    Consistency: medium    Position: mid       Lab Results   Component Value Date    WBC 6.26 09/24/2017    HGB 8.0 (L) 09/24/2017    HCT 25.7 (L) 09/24/2017    MCV 75 (L) 09/24/2017     09/24/2017     CMP  Sodium   Date Value Ref Range Status   09/24/2017 137 136 - 145 mmol/L Final     Potassium   Date Value Ref Range Status   09/24/2017 3.9 3.5 - 5.1 mmol/L Final     Chloride   Date Value Ref Range Status   09/24/2017 107 95 - 110 mmol/L Final     CO2   Date Value Ref Range Status   09/24/2017 23 23 - 29 mmol/L Final     Glucose   Date Value Ref Range Status   09/24/2017 79 70 - 110 mg/dL Final     BUN, Bld   Date Value Ref Range Status   09/24/2017 9 6 - 20 mg/dL Final     Creatinine   Date Value Ref Range Status   09/24/2017 0.6 0.5 - 1.4 mg/dL Final     Calcium   Date Value Ref Range Status   09/24/2017 9.1 8.7 - 10.5 mg/dL Final     Total Protein   Date Value Ref Range Status   09/24/2017 7.1 6.0 - 8.4 g/dL Final     Albumin   Date Value Ref Range Status   09/24/2017 2.8 (L) 3.5 - 5.2 g/dL Final     Total Bilirubin   Date Value Ref Range Status   09/24/2017 0.3 0.1 - 1.0 mg/dL Final     Comment:     For infants and newborns, interpretation of results should be based  on gestational age, weight and in agreement with clinical  observations.  Premature Infant recommended reference ranges:  Up to 24 hours.............<8.0 mg/dL  Up to 48 hours............<12.0 mg/dL  3-5 days..................<15.0 mg/dL  6-29 days.................<15.0 mg/dL       Alkaline Phosphatase   Date Value Ref Range  Status   09/24/2017 58 55 - 135 U/L Final     AST   Date Value Ref Range Status   09/24/2017 29 10 - 40 U/L Final     ALT   Date Value Ref Range Status   09/24/2017 27 10 - 44 U/L Final     Anion Gap   Date Value Ref Range Status   09/24/2017 7 (L) 8 - 16 mmol/L Final     eGFR if    Date Value Ref Range Status   09/24/2017 >60 >60 mL/min/1.73 m^2 Final     eGFR if non    Date Value Ref Range Status   09/24/2017 >60 >60 mL/min/1.73 m^2 Final     Comment:     Calculation used to obtain the estimated glomerular filtration  rate (eGFR) is the CKD-EPI equation. Since race is unknown   in our information system, the eGFR values for   -American and Non--American patients are given   for each creatinine result.       Urinalysis    Recent Labs  Lab 09/24/17  1856   COLORU Yellow   SPECGRAV 1.020   PHUR 8.0   PROTEINUA 1+*   BACTERIA Many*   NITRITE Negative   LEUKOCYTESUR 2+*   UROBILINOGEN 1.0   HYALINECASTS 0         OB Lab History:     No results found for: GROUPTRH  No results found for: INDIRECTCOOM  Lab Results   Component Value Date    WBC 6.26 09/24/2017    HGB 8.0 (L) 09/24/2017    HCT 25.7 (L) 09/24/2017    MCV 75 (L) 09/24/2017     09/24/2017     Lab Results   Component Value Date    TSH 1.085 02/26/2017     No results found for: RUBELLAIGGAN  No results found for: RPR  No results found for: YEG90GLXY  No results found for: HEPBSAG  No results found for this or any previous visit.  No results found for this or any previous visit.  No results found for this or any previous visit.  No results found for this or any previous visit.    ASSESSMENT/PLAN:     IUP 32w6d gestation  Cramping affecting pregnancy    Patient Active Problem List   Diagnosis    Diarrhea         PLAN:   1.  Discharge home.   2.  PTL precautions.   3.  Fetal kick counts.   4.  Increase PO hydration.  5.  Follow up with primary obstetrician in 2 days.

## 2017-11-01 PROBLEM — Z3A.38 38 WEEKS GESTATION OF PREGNANCY: Status: ACTIVE | Noted: 2017-11-01

## 2018-10-15 PROBLEM — Z3A.38 38 WEEKS GESTATION OF PREGNANCY: Status: RESOLVED | Noted: 2017-11-01 | Resolved: 2018-10-15

## 2021-05-06 ENCOUNTER — PATIENT MESSAGE (OUTPATIENT)
Dept: RESEARCH | Facility: HOSPITAL | Age: 31
End: 2021-05-06

## 2021-07-01 ENCOUNTER — PATIENT MESSAGE (OUTPATIENT)
Dept: ADMINISTRATIVE | Facility: OTHER | Age: 31
End: 2021-07-01

## 2023-11-25 ENCOUNTER — HOSPITAL ENCOUNTER (EMERGENCY)
Facility: HOSPITAL | Age: 33
Discharge: HOME | End: 2023-11-26
Attending: SURGERY
Payer: MEDICAID

## 2023-11-25 VITALS
HEART RATE: 105 BPM | SYSTOLIC BLOOD PRESSURE: 114 MMHG | OXYGEN SATURATION: 100 % | HEIGHT: 63 IN | TEMPERATURE: 99 F | RESPIRATION RATE: 18 BRPM | WEIGHT: 140 LBS | DIASTOLIC BLOOD PRESSURE: 72 MMHG | BODY MASS INDEX: 24.8 KG/M2

## 2023-11-25 DIAGNOSIS — M25.572 LEFT ANKLE PAIN: ICD-10-CM

## 2023-11-25 PROCEDURE — 25000003 PHARM REV CODE 250: Performed by: SURGERY

## 2023-11-25 PROCEDURE — 99283 EMERGENCY DEPT VISIT LOW MDM: CPT

## 2023-11-25 RX ORDER — IBUPROFEN 800 MG/1
800 TABLET ORAL
Status: COMPLETED | OUTPATIENT
Start: 2023-11-25 | End: 2023-11-25

## 2023-11-25 RX ORDER — ACETAMINOPHEN 500 MG
1000 TABLET ORAL
Status: COMPLETED | OUTPATIENT
Start: 2023-11-25 | End: 2023-11-25

## 2023-11-25 RX ADMIN — ACETAMINOPHEN 1000 MG: 500 TABLET ORAL at 11:11

## 2023-11-25 RX ADMIN — IBUPROFEN 800 MG: 800 TABLET, FILM COATED ORAL at 11:11

## 2023-11-25 NOTE — Clinical Note
"Franky Connollykendrick Amezcua was seen and treated in our emergency department on 11/25/2023.  She may return to work on 11/29/2023.       If you have any questions or concerns, please don't hesitate to call.      NICKO Mcarthur RN    "

## 2023-11-26 NOTE — ED PROVIDER NOTES
Encounter Date: 2023       History     Chief Complaint   Patient presents with    Ankle Pain     Pt to ED with c/o left ankle pain after walking down some stairs approximately 1 hour ago.      Franky Amezcua is a 33 y.o. female that presents with left ankle pain tonight  Awkward twist of left ankle with immediate pain afterwards on ED interview  No obvious gross deformity left ankle with mild pain with weight-bearing  No obvious signs of distress, good range of motion left ankle on evaluation  Patient denies any previous left ankle trauma, no other complaints tonight    The history is provided by the patient.     Review of patient's allergies indicates:  No Known Allergies  History reviewed. No pertinent past medical history.  Past Surgical History:   Procedure Laterality Date     SECTION       History reviewed. No pertinent family history.  Social History     Tobacco Use    Smoking status: Never    Smokeless tobacco: Never   Substance Use Topics    Alcohol use: No    Drug use: No     Review of Systems   Constitutional: Negative.    HENT: Negative.     Eyes: Negative.    Respiratory: Negative.     Cardiovascular: Negative.    Gastrointestinal: Negative.    Genitourinary: Negative.    Musculoskeletal:         (+) left ankle pain   Skin: Negative.    Neurological: Negative.    Psychiatric/Behavioral: Negative.         Physical Exam     Initial Vitals [23 2308]   BP Pulse Resp Temp SpO2   114/72 105 18 99 °F (37.2 °C) 100 %      MAP       --         Physical Exam    Nursing note and vitals reviewed.  Constitutional: Vital signs are normal. She appears well-developed and well-nourished. She is cooperative.   HENT:   Head: Normocephalic and atraumatic.   Eyes: Conjunctivae, EOM and lids are normal. Pupils are equal, round, and reactive to light.   Neck: Trachea normal and phonation normal. Neck supple. No JVD present.   Normal range of motion.   Full passive range of motion without pain.      Cardiovascular:  Normal rate, regular rhythm, S1 normal, S2 normal, normal heart sounds, intact distal pulses and normal pulses.           Pulmonary/Chest: Effort normal and breath sounds normal.   Abdominal: Abdomen is soft and flat. Bowel sounds are normal.   Musculoskeletal:      Cervical back: Full passive range of motion without pain, normal range of motion and neck supple.      Comments: (+) minimal left ankle tenderness with no gross deformity     Neurological: She is alert and oriented to person, place, and time. She has normal strength.   Skin: Skin is warm, dry and intact. Capillary refill takes less than 2 seconds.         ED Course   Procedures  Labs Reviewed - No data to display       Imaging Results              X-Ray Ankle Complete Left (In process)                      Medications   acetaminophen tablet 1,000 mg (has no administration in time range)   ibuprofen tablet 800 mg (has no administration in time range)     Medical Decision Making  Left ankle pain after an awkward twist & fall earlier this evening at home PTA  Differential includes sprain, strain, fracture, dislocation, ligament/tendon injury    Problems Addressed:  Left ankle pain: complicated acute illness or injury    Amount and/or Complexity of Data Reviewed  Radiology: ordered and independent interpretation performed.    ED Management & Risk of Complications, Morbidity, Mortality:  (-) x-ray left ankle, crutches & Ace wrap on ER discharge tonight  Nonweightbearing going forward, Tylenol for pain on discharge tonight  Follow-up with primary care MD until complete resolution of symptoms  This patient does not need to be hospitalized on ER evaluation today  The patient's diagnosis is not limited by social determinants of health  Does not require surgery or procedure (major or minor), no risk factors  Pt/Family counseled to return to the ER with any concerning symptoms    Clinical Impression:  Final diagnoses:  [M25.572] Left ankle pain           ED Disposition Condition    Discharge Stable          ED Prescriptions    None       Follow-up Information       Follow up With Specialties Details Why Contact Info    Sheldon Lee MD Family Medicine Go in 2 days  [address] [id number]  [phone]               Robbie Perera MD  11/25/23 8641